# Patient Record
Sex: MALE | Race: WHITE | NOT HISPANIC OR LATINO | Employment: OTHER | URBAN - METROPOLITAN AREA
[De-identification: names, ages, dates, MRNs, and addresses within clinical notes are randomized per-mention and may not be internally consistent; named-entity substitution may affect disease eponyms.]

---

## 2017-12-15 NOTE — PATIENT DISCUSSION
CATARACT, OS- VISUALLY SIGNIFICANT. SCHEDULE PHACO WITH IOL IF VISUAL SYMPTOMS PERSIST. GLASSES RX GIVEN TO FILL IF DESIRES IN THE EVENT PATIENT DOES NOT PROCEED WITH SURGERY.

## 2018-09-25 NOTE — PATIENT DISCUSSION
Dry Eye Syndrome (FÁTIMA) Counseling: Dry Eye Syndrome, also known as Keratoconjunctivitis Sicca, is a common condition that occurs when your tears are not able to provide adequate lubrication for your eyes. Symptoms can be exacerbated by environmental factors such as smoke, wind, or prolonged eye use. Treatment options include, but are not limited to, artificial tears, punctal plugs, Restasis, oral omega-3 supplements, and lubricating ointments. I have explained to the patient that successful management is dependent on patient compliance with treatment as prescribed and/or the treatment regimen.

## 2018-09-25 NOTE — PATIENT DISCUSSION
Surgery  Counseling: I have discussed the option of glasses versus cataract surgery versus following. It was explained that when vision no longer meets the patient's visual needs and a new prescription for glasses is not likely to improve the patient's visual symptoms, the option of cataract surgery is a reasonable next step. It was explained that there is no guarantee that removing the cataract will improve their visual symptoms, however; it is believed that the cataract is contributing to the patient's visual impairment and surgery may significantly improve both the visual and functional status of the patient. The risks, benefits and alternatives of surgery were discussed with the patient. After this discussion, the patient desires to proceed with cataract surgery with implantation of an intraocular lens to improve vision for reading and glare .

## 2018-09-25 NOTE — PATIENT DISCUSSION
REFRACTIVE ERROR, OU - DISCUSSED OPTION OF CORRECTING AT THE TIME OF CATARACT SURGERY. PT UNDERSTANDS AND ELECTS TO CONSIDER OPTIONS.

## 2018-10-01 NOTE — PATIENT DISCUSSION
***The patient is interested in refractive cataract surgery. After discussing all options for becoming less dependent on glasses after surgery, the patient has elected distance vision with astigmatism correction if needed.  The patient is aware they will depend on glasses for all distance/near/intermediate vision due to balancing the left eye with the right eye***

## 2018-10-01 NOTE — PATIENT DISCUSSION
REFRACTIVE ERROR- OPTS DISC W/ PT. PT UNDERSTANDS AND ELECTS TO PROCEED W/ REFRACTIVE CATARACT SURGERY TO REDUCE ASTIGMATISM WITH TORIC IOL

## 2018-10-01 NOTE — PATIENT DISCUSSION
DISCUSSED RESIDUAL REFRACTIVE ERROR IN THE RIGHT EYE. GIVEN OPTION TO BALANCE OS TO OD.  PATIENT PREFERS BE BALANCE THE LEFT EYE, WILL PLAN ON LEAVING THE LEFT EYE HYPEROPIC +1.00 (25.00 IOL PLANNED)

## 2018-10-11 NOTE — PATIENT DISCUSSION
***This patient had refractive cataract surgery performed. A monofocal  IOL was placed to achieve a target refraction of +1.00 (which should provide them with satisfactory intermediate vision.

## 2018-10-16 NOTE — PATIENT DISCUSSION
PT NOTICES A FLARE OF LIGHT THAT MAY BE RELATED TO A FOLD IN HER CAPSULE. WE WILL EVALUATE AND TREAT AS NEEDED AFTER 3 MO. SHE HAD THE SAME ISSUES IN THE FELLOW EYE THAT WAS RESOLVED WITH YAG LASER.

## 2018-10-16 NOTE — PATIENT DISCUSSION
VISUAL DISTURBANCE, OS- RESOLVED WITH GLS. PT WILL USE OTC GLS FOR NOW AND HAVE FINAL RX IN 2 WEEKS WITH DR COSTA.

## 2018-11-06 NOTE — PATIENT DISCUSSION
PT STILL EXPERIENCING A FLARE OF LIGHT IN THE LEFT EYE. DISCUSSED MAY BE RELATED TO A FOLD IN HER CAPSULE VS REFRACTIVE ERROR. RECOMMEND PATIENT FILL GLASSES PRESCRIPTION AND IF PERSISTENT CONSIDER YAG EVAL IN 3 MONTHS. PATIENT STATES SHE HAD THE SAME ISSUES IN THE FELLOW EYE THAT WAS RESOLVED WITH YAG LASER.

## 2018-11-06 NOTE — PATIENT DISCUSSION
RECOMMENDED FOLLOW UP IN 3 MONTHS WITH PERSISTENT FLARE OF LIGHT AFTER FILLING GLASSES PRESCRIPTON OR SYMPTOMS SUBSIDE IN THE LEFT EYE FOLLOW UP IN ONE YEAR FOR EXAM WITH DR. COSTA

## 2019-03-26 NOTE — PATIENT DISCUSSION
BLURRED VA, OS- LIKELY DUE TO ASYMMETRICAL ASTIGMATISM WHICH HAS BEEN PRESENT FOR A LONG PERIOD OF TIME. RTC TO CHALLENGE CYL WITHOUT DILATION. PAST RX SHOWS +1.50 OF CYL WHICH IS NOT PRESENT IN HER CURRENT GLS, CHALLENGE MORE CYL AND TRIAL FRAME TO EVALUATE IF HER SYMPTOMS.

## 2019-04-17 ENCOUNTER — CONSULT (OUTPATIENT)
Dept: URBAN - METROPOLITAN AREA CLINIC 43 | Facility: CLINIC | Age: 64
End: 2019-04-17

## 2019-04-17 DIAGNOSIS — H25.812: ICD-10-CM

## 2019-04-17 DIAGNOSIS — H25.811: ICD-10-CM

## 2019-04-17 DIAGNOSIS — H18.51: ICD-10-CM

## 2019-04-17 PROCEDURE — 92286 ANT SGM IMG I&R SPECLR MIC: CPT

## 2019-04-17 PROCEDURE — 92025-2 CORNEAL TOPOGRAPHY, PT

## 2019-04-17 PROCEDURE — 92136TC INTERFEROMETRY - TECHNICAL COMPONENT

## 2019-04-17 PROCEDURE — V2799I IMPRIMIS PREDGATIBROM

## 2019-04-17 PROCEDURE — 99204 OFFICE O/P NEW MOD 45 MIN: CPT

## 2019-04-17 ASSESSMENT — VISUAL ACUITY
OS_AM: 20/20
OD_SC: J10
OS_BAT: 20/80
OS_SC: J12
OD_RAM: 20/25
OD_CC: 20/60
OS_CC: 20/20-1
OD_SC: 20/200
OD_BAT: <20/400
OS_SC: 20/50-2
OD_CC: J3
OS_CC: J1

## 2019-04-17 ASSESSMENT — TONOMETRY
OS_IOP_MMHG: 16
OD_IOP_MMHG: 17

## 2019-04-24 ENCOUNTER — PRE-OP/H&P (OUTPATIENT)
Dept: URBAN - METROPOLITAN AREA CLINIC 39 | Facility: CLINIC | Age: 64
End: 2019-04-24

## 2019-04-24 ENCOUNTER — SURGERY/PROCEDURE (OUTPATIENT)
Dept: URBAN - METROPOLITAN AREA CLINIC 43 | Facility: CLINIC | Age: 64
End: 2019-04-24

## 2019-04-24 DIAGNOSIS — H18.51: ICD-10-CM

## 2019-04-24 DIAGNOSIS — H25.811: ICD-10-CM

## 2019-04-24 DIAGNOSIS — H25.812: ICD-10-CM

## 2019-04-24 PROCEDURE — 65772LRI LRI DURING CAT SX

## 2019-04-24 PROCEDURE — 66999LNSR LENSAR LASER FOR CAT SX

## 2019-04-24 PROCEDURE — 6698454AV REMOVE CATARACT, INSERT ADVANCED LENS (SX ONLY)

## 2019-04-24 PROCEDURE — 99211T TECH SERVICE

## 2019-04-25 ENCOUNTER — POST OP/EVAL OF SECOND EYE (OUTPATIENT)
Dept: URBAN - METROPOLITAN AREA CLINIC 43 | Facility: CLINIC | Age: 64
End: 2019-04-25

## 2019-04-25 DIAGNOSIS — Z96.1: ICD-10-CM

## 2019-04-25 DIAGNOSIS — H25.811: ICD-10-CM

## 2019-04-25 PROCEDURE — P6698455 NON-COMANAGED ADVANCED PO

## 2019-04-25 ASSESSMENT — TONOMETRY
OD_IOP_MMHG: 16
OS_IOP_MMHG: 26

## 2019-04-25 ASSESSMENT — VISUAL ACUITY
OD_SC: J8
OD_SC: 20/100
OS_SC: J12
OD_BAT: >20/400
OS_SC: 20/30

## 2019-04-30 ENCOUNTER — SURGERY/PROCEDURE (OUTPATIENT)
Dept: URBAN - METROPOLITAN AREA CLINIC 43 | Facility: CLINIC | Age: 64
End: 2019-04-30

## 2019-04-30 ENCOUNTER — PRE-OP/H&P (OUTPATIENT)
Dept: URBAN - METROPOLITAN AREA CLINIC 39 | Facility: CLINIC | Age: 64
End: 2019-04-30

## 2019-04-30 DIAGNOSIS — H25.811: ICD-10-CM

## 2019-04-30 DIAGNOSIS — Z96.1: ICD-10-CM

## 2019-04-30 PROCEDURE — 99211T TECH SERVICE

## 2019-04-30 PROCEDURE — 6698454AV REMOVE CATARACT, INSERT ADVANCED LENS (SX ONLY)

## 2019-04-30 PROCEDURE — 65772LRI LRI DURING CAT SX

## 2019-04-30 PROCEDURE — 66999LNSR LENSAR LASER FOR CAT SX

## 2019-04-30 ASSESSMENT — VISUAL ACUITY
OD_SC: 20/100
OS_SC: 20/20
OD_SC: J8
OS_SC: J8

## 2019-04-30 ASSESSMENT — TONOMETRY
OS_IOP_MMHG: 22
OD_IOP_MMHG: 20

## 2019-05-01 ENCOUNTER — CATARACT POST-OP 1-DAY (OUTPATIENT)
Dept: URBAN - METROPOLITAN AREA CLINIC 43 | Facility: CLINIC | Age: 64
End: 2019-05-01

## 2019-05-01 DIAGNOSIS — Z96.1: ICD-10-CM

## 2019-05-01 PROCEDURE — P6698455 NON-COMANAGED ADVANCED PO

## 2019-05-01 ASSESSMENT — VISUAL ACUITY
OS_SC: J8
OS_SC: 20/25-1
OD_PH: 20/30
OD_SC: 20/60-2
OD_SC: J8

## 2019-05-01 ASSESSMENT — TONOMETRY
OS_IOP_MMHG: 15
OD_IOP_MMHG: 17

## 2019-08-26 ENCOUNTER — SURGERY/PROCEDURE (OUTPATIENT)
Dept: URBAN - METROPOLITAN AREA SURGERY 14 | Facility: SURGERY | Age: 64
End: 2019-08-26

## 2019-08-26 ENCOUNTER — CONSULT (OUTPATIENT)
Dept: URBAN - METROPOLITAN AREA CLINIC 39 | Facility: CLINIC | Age: 64
End: 2019-08-26

## 2019-08-26 DIAGNOSIS — H26.491: ICD-10-CM

## 2019-08-26 PROCEDURE — 6682154 YAG CAPSULOTOMY(SX ONLY)

## 2019-08-26 PROCEDURE — 99214 OFFICE O/P EST MOD 30 MIN: CPT

## 2019-08-26 PROCEDURE — 92134 CPTRZ OPH DX IMG PST SGM RTA: CPT

## 2019-08-26 ASSESSMENT — VISUAL ACUITY
OD_SC: 20/40
OS_SC: J8 @ 18"
OD_BAT: 20/50
OD_SC: J3
OU_SC: 20/20
OS_BAT: 20/40
OU_SC: J2
OS_SC: 20/20

## 2019-08-26 ASSESSMENT — TONOMETRY
OS_IOP_MMHG: 18
OD_IOP_MMHG: 17

## 2019-10-22 NOTE — PATIENT DISCUSSION
PCO OS - NOT VISUALLY SIGNIFICANT. THE PATIENT REPORTS THAT THE HALOS SHE WAS EXPERIENCING ARE GONE. WILL RE-EVALUATE IN 6 MONTHS TO A YEAR.

## 2019-11-19 ENCOUNTER — POST-OP (OUTPATIENT)
Dept: URBAN - METROPOLITAN AREA CLINIC 43 | Facility: CLINIC | Age: 64
End: 2019-11-19

## 2019-11-19 DIAGNOSIS — Z96.1: ICD-10-CM

## 2019-11-19 PROCEDURE — 99024 POSTOP FOLLOW-UP VISIT: CPT

## 2019-11-19 PROCEDURE — 92025 CPTRIZED CORNEAL TOPOGRAPHY: CPT

## 2019-11-19 ASSESSMENT — VISUAL ACUITY
OU_SC: J2 @ 18"
OD_SC: 20/40-2
OS_SC: J8 @ 18"
OS_SC: 20/20-1
OU_SC: 20/20
OD_SC: J3

## 2019-11-25 ENCOUNTER — RETINA CONSULT (OUTPATIENT)
Dept: URBAN - METROPOLITAN AREA CLINIC 43 | Facility: CLINIC | Age: 64
End: 2019-11-25

## 2019-11-25 DIAGNOSIS — H43.392: ICD-10-CM

## 2019-11-25 DIAGNOSIS — H43.813: ICD-10-CM

## 2019-11-25 DIAGNOSIS — H35.30: ICD-10-CM

## 2019-11-25 DIAGNOSIS — H43.391: ICD-10-CM

## 2019-11-25 PROCEDURE — 9222550 BILAT EXTENDED OPHTHALMOSCOPY, FIRST

## 2019-11-25 PROCEDURE — 92250 FUNDUS PHOTOGRAPHY W/I&R: CPT

## 2019-11-25 PROCEDURE — 92235 FLUORESCEIN ANGRPH MLTIFRAME: CPT

## 2019-11-25 PROCEDURE — 92014 COMPRE OPH EXAM EST PT 1/>: CPT

## 2019-11-25 ASSESSMENT — TONOMETRY
OS_IOP_MMHG: 12
OD_IOP_MMHG: 15

## 2019-11-25 ASSESSMENT — VISUAL ACUITY
OS_SC: 20/25-1
OD_SC: 20/50
OD_PH: 20/30

## 2019-12-12 ENCOUNTER — LASIK CONSULTATION (OUTPATIENT)
Dept: URBAN - METROPOLITAN AREA CLINIC 43 | Facility: CLINIC | Age: 64
End: 2019-12-12

## 2019-12-12 DIAGNOSIS — H52.7: ICD-10-CM

## 2019-12-12 PROCEDURE — 99499LS LASIK SCREENING

## 2019-12-12 PROCEDURE — 92025 CPTRIZED CORNEAL TOPOGRAPHY: CPT

## 2019-12-12 RX ORDER — LIFITEGRAST 50 MG/ML: 1 SOLUTION/ DROPS OPHTHALMIC TWICE A DAY

## 2019-12-12 ASSESSMENT — VISUAL ACUITY
OD_SC: J5
OS_SC: 20/20
OS_SC: J6
OD_SC: 20/60

## 2020-04-27 ENCOUNTER — FOLLOW UP (OUTPATIENT)
Dept: URBAN - METROPOLITAN AREA CLINIC 43 | Facility: CLINIC | Age: 65
End: 2020-04-27

## 2020-04-27 DIAGNOSIS — H18.51: ICD-10-CM

## 2020-04-27 DIAGNOSIS — Z96.1: ICD-10-CM

## 2020-04-27 DIAGNOSIS — H52.7: ICD-10-CM

## 2020-04-27 DIAGNOSIS — H01.003: ICD-10-CM

## 2020-04-27 DIAGNOSIS — H01.006: ICD-10-CM

## 2020-04-27 PROCEDURE — 92025 CPTRIZED CORNEAL TOPOGRAPHY: CPT

## 2020-04-27 PROCEDURE — 92286 ANT SGM IMG I&R SPECLR MIC: CPT

## 2020-04-27 PROCEDURE — V2799CY CYCLOSPORINE 0.1% - KLARITY C

## 2020-04-27 PROCEDURE — 92012 INTRM OPH EXAM EST PATIENT: CPT

## 2020-04-27 RX ORDER — PREDNISOLONE ACETATE 10 MG/ML: 1 SUSPENSION/ DROPS OPHTHALMIC

## 2020-04-27 RX ORDER — MOXIFLOXACIN HYDROCHLORIDE 5 MG/ML: 1 SOLUTION/ DROPS OPHTHALMIC

## 2020-04-27 ASSESSMENT — VISUAL ACUITY
OD_PH: 20/20-1
OD_SC: 20/40-2
OD_SC: J6
OS_SC: 20/20
OS_SC: J8

## 2020-04-27 ASSESSMENT — TONOMETRY
OS_IOP_MMHG: 16
OD_IOP_MMHG: 15

## 2020-05-05 ENCOUNTER — SURGERY/PROCEDURE (OUTPATIENT)
Dept: URBAN - METROPOLITAN AREA CLINIC 39 | Facility: CLINIC | Age: 65
End: 2020-05-05

## 2020-05-05 ENCOUNTER — ESTABLISHED PATIENT (OUTPATIENT)
Dept: URBAN - METROPOLITAN AREA CLINIC 39 | Facility: CLINIC | Age: 65
End: 2020-05-05

## 2020-05-05 DIAGNOSIS — H52.7: ICD-10-CM

## 2020-05-05 PROCEDURE — 66999ISPP INTRALASE LASIK S/P ADVANCED / CUSTOM VISION < 12 MONTHS

## 2020-05-05 PROCEDURE — 99211HP H&P OFFICE/OUTPATIENT VISIT, EST

## 2020-05-06 ENCOUNTER — 1 DAY LASIK POST OP (OUTPATIENT)
Dept: URBAN - METROPOLITAN AREA CLINIC 43 | Facility: CLINIC | Age: 65
End: 2020-05-06

## 2020-05-06 DIAGNOSIS — Z98.890: ICD-10-CM

## 2020-05-06 PROCEDURE — 99024 POSTOP FOLLOW-UP VISIT: CPT

## 2020-05-06 ASSESSMENT — VISUAL ACUITY
OD_SC: 20/40
OS_SC: 20/20
OD_SC: J1
OS_SC: J12

## 2020-05-20 ENCOUNTER — LASIK POST OP (OUTPATIENT)
Dept: URBAN - METROPOLITAN AREA CLINIC 43 | Facility: CLINIC | Age: 65
End: 2020-05-20

## 2020-05-20 DIAGNOSIS — Z98.890: ICD-10-CM

## 2020-05-20 PROCEDURE — 99024 POSTOP FOLLOW-UP VISIT: CPT

## 2020-05-20 ASSESSMENT — VISUAL ACUITY
OD_SC: J1
OS_SC: J12
OS_SC: 20/20-1
OD_SC: 20/25-3

## 2020-06-01 ENCOUNTER — ESTABLISHED COMPREHENSIVE EXAM (OUTPATIENT)
Dept: URBAN - METROPOLITAN AREA CLINIC 43 | Facility: CLINIC | Age: 65
End: 2020-06-01

## 2020-06-01 DIAGNOSIS — H43.813: ICD-10-CM

## 2020-06-01 DIAGNOSIS — H35.30: ICD-10-CM

## 2020-06-01 DIAGNOSIS — H43.391: ICD-10-CM

## 2020-06-01 DIAGNOSIS — H43.392: ICD-10-CM

## 2020-06-01 PROCEDURE — 92014 COMPRE OPH EXAM EST PT 1/>: CPT

## 2020-06-01 PROCEDURE — 92250 FUNDUS PHOTOGRAPHY W/I&R: CPT

## 2020-06-01 ASSESSMENT — VISUAL ACUITY
OD_SC: 20/25-2
OS_SC: 20/20-1

## 2021-12-17 NOTE — PATIENT DISCUSSION
A posterior vitreous detachment (PVD) is a condition of the eye in which the vitreous membrane separates from the retina. I have discussed the possibility of a retinal tear or detachment. The signs/symptoms of a retinal tear/detachment were reviewed including, but not limited to, A sudden increase in size and number of floaters, indicating a retinal tear may be occurring, a sudden appearance of flashes (which could be the first stage of a retinal tear or detachment), having a shadow appear in the periphery of your field of vision, seeing a gray curtain moving across your field of vision, and/or a sudden decrease in your vision. The patient was instructed to contact us immediately if they noticed any of these signs or symptoms.

## 2022-02-07 NOTE — PATIENT DISCUSSION
If floaters are still bothersome in the right eye will send to Dr. Katlyn Sutherland for evaluation and potential Vitrectomy.

## 2022-09-20 ENCOUNTER — SURGERY/PROCEDURE (OUTPATIENT)
Dept: URBAN - METROPOLITAN AREA SURGERY 14 | Facility: SURGERY | Age: 67
End: 2022-09-20

## 2022-09-20 ENCOUNTER — CONSULTATION/EVALUATION (OUTPATIENT)
Dept: URBAN - METROPOLITAN AREA CLINIC 39 | Facility: CLINIC | Age: 67
End: 2022-09-20

## 2022-09-20 DIAGNOSIS — H26.492: ICD-10-CM

## 2022-09-20 DIAGNOSIS — Z96.1: ICD-10-CM

## 2022-09-20 DIAGNOSIS — H35.30: ICD-10-CM

## 2022-09-20 PROCEDURE — 92014 COMPRE OPH EXAM EST PT 1/>: CPT

## 2022-09-20 PROCEDURE — 6682154 YAG CAPSULOTOMY(SX ONLY)

## 2022-09-20 PROCEDURE — 92134 CPTRZ OPH DX IMG PST SGM RTA: CPT

## 2022-09-20 ASSESSMENT — VISUAL ACUITY
OD_SC: J1
OD_SC: 20/20
OS_SC: J12
OS_SC: 20/25
OS_BAT: 20/400

## 2022-09-20 ASSESSMENT — TONOMETRY
OD_IOP_MMHG: 14
OS_IOP_MMHG: 15

## 2023-01-01 NOTE — PATIENT DISCUSSION
COUNSELING: Mom notified of below results and recommendations. Mom verbalized understanding.     ----- Message from Aliyah Vasquez MD sent at 2023  2:10 PM CDT -----  Please contact parent/guardian with results.  Small cyst. Monitor for now. No need for removal unless it gets significantly larger, becomes red or painful.